# Patient Record
Sex: FEMALE | Race: WHITE | Employment: UNEMPLOYED | URBAN - METROPOLITAN AREA
[De-identification: names, ages, dates, MRNs, and addresses within clinical notes are randomized per-mention and may not be internally consistent; named-entity substitution may affect disease eponyms.]

---

## 2018-01-06 ENCOUNTER — HOSPITAL ENCOUNTER (EMERGENCY)
Facility: CLINIC | Age: 58
Discharge: HOME | End: 2018-01-06
Attending: FAMILY MEDICINE | Admitting: FAMILY MEDICINE
Payer: COMMERCIAL

## 2018-01-06 VITALS
RESPIRATION RATE: 20 BRPM | SYSTOLIC BLOOD PRESSURE: 118 MMHG | DIASTOLIC BLOOD PRESSURE: 69 MMHG | TEMPERATURE: 97.2 F | OXYGEN SATURATION: 96 % | WEIGHT: 235.2 LBS

## 2018-01-06 DIAGNOSIS — J20.9 ACUTE BRONCHITIS, UNSPECIFIED ORGANISM: ICD-10-CM

## 2018-01-06 DIAGNOSIS — J45.21 MILD INTERMITTENT ASTHMA WITH EXACERBATION: ICD-10-CM

## 2018-01-06 DIAGNOSIS — Z72.0 TOBACCO ABUSE: ICD-10-CM

## 2018-01-06 PROCEDURE — 99284 EMERGENCY DEPT VISIT MOD MDM: CPT | Mod: Z6 | Performed by: FAMILY MEDICINE

## 2018-01-06 PROCEDURE — 99282 EMERGENCY DEPT VISIT SF MDM: CPT | Performed by: FAMILY MEDICINE

## 2018-01-06 RX ORDER — AZITHROMYCIN 250 MG/1
TABLET, FILM COATED ORAL
Qty: 6 TABLET | Refills: 0 | Status: SHIPPED | OUTPATIENT
Start: 2018-01-06 | End: 2018-06-08

## 2018-01-06 RX ORDER — PREDNISONE 20 MG/1
TABLET ORAL
Qty: 10 TABLET | Refills: 0 | Status: SHIPPED | OUTPATIENT
Start: 2018-01-06 | End: 2018-06-08

## 2018-01-06 ASSESSMENT — ENCOUNTER SYMPTOMS
TROUBLE SWALLOWING: 0
VOMITING: 0
PALPITATIONS: 0
COUGH: 1
ABDOMINAL PAIN: 0
LIGHT-HEADEDNESS: 0
BACK PAIN: 0
WHEEZING: 1
NAUSEA: 0
CONFUSION: 0
CHEST TIGHTNESS: 0
SHORTNESS OF BREATH: 1
DYSURIA: 0
DIARRHEA: 0
EYE REDNESS: 0
CHILLS: 0
SINUS PAIN: 1
POLYDIPSIA: 0
FATIGUE: 1
FEVER: 0
DIZZINESS: 0
WEAKNESS: 0
SORE THROAT: 0

## 2018-01-06 NOTE — ED PROVIDER NOTES
History     Chief Complaint   Patient presents with     Cold Symptoms     HPI  Zina Yu is a 57 year old female who presents to the emergency room with increased cough congestion and wheezing.  The patient does continue to smoke occasionally.  She had a cold 3 weeks ago and has been coughing ever since.  Her sinuses feel constantly plugged.  The last few days she has increased wheezing and has been using her albuterol inhaler and nebulizer.  She has been coughing up purulent sputum.  She denies any fever with this.  In the past when she has had exacerbations she has been on Zithromax and prednisone.  She thinks the last time she was on these was at least 2 years ago.  She has a history of pneumonia but does not feel feverish or sick like she was with that.  She has more or less quit smoking but does have an occasional cigarette every other or third day.    Problem List:    There are no active problems to display for this patient.       Past Medical History:    Past Medical History:   Diagnosis Date     Chronic kidney disease      Hypertension      Uncomplicated asthma        Past Surgical History:    Past Surgical History:   Procedure Laterality Date     BACK SURGERY      back surgery       Family History:    No family history on file.    Social History:  Marital Status:   [4]  Social History   Substance Use Topics     Smoking status: Current Some Day Smoker     Smokeless tobacco: Not on file     Alcohol use No        Medications:      LISINOPRIL PO   METOPROLOL SUCCINATE ER PO   azithromycin (ZITHROMAX Z-AG) 250 MG tablet   predniSONE (DELTASONE) 20 MG tablet         Review of Systems   Constitutional: Positive for fatigue. Negative for chills and fever.   HENT: Positive for congestion and sinus pain. Negative for sore throat and trouble swallowing.    Eyes: Negative for redness.   Respiratory: Positive for cough, shortness of breath and wheezing. Negative for chest tightness.    Cardiovascular:  Negative for chest pain and palpitations.   Gastrointestinal: Negative for abdominal pain, diarrhea, nausea and vomiting.   Endocrine: Negative for polydipsia and polyuria.   Genitourinary: Negative for dysuria.   Musculoskeletal: Negative for back pain.   Skin: Negative for rash.   Allergic/Immunologic: Negative for immunocompromised state.   Neurological: Negative for dizziness, weakness and light-headedness.   Psychiatric/Behavioral: Negative for confusion.       Physical Exam   BP: 118/69  Heart Rate: 94  Temp: 97.2  F (36.2  C)  Resp: 20  Weight: 106.7 kg (235 lb 3.2 oz)  SpO2: 96 %      Physical Exam   Constitutional: She is oriented to person, place, and time.   Alert female.  Afebrile.  Frequent harsh cough.  Nonproductive./69  Temp 97.2  F (36.2  C) (Oral)  Resp 20  Wt 106.7 kg (235 lb 3.2 oz)  SpO2 96%     HENT:   Head: Normocephalic.   Right Ear: External ear normal.   Left Ear: External ear normal.   Nose: Nose normal.   Mouth/Throat: Oropharynx is clear and moist.   Eyes: Conjunctivae are normal.   Neck: Normal range of motion. Neck supple.   Cardiovascular: Normal rate, regular rhythm and normal heart sounds.    Pulmonary/Chest: Effort normal. No respiratory distress. She has wheezes.   Scattered rhonchi and wheeze which changes with coughing.  Otherwise good air movement all lung fields.  No prolonged expiratory phase.  No increased work of breathing.   Abdominal: Soft.   Musculoskeletal: Normal range of motion.   Neurological: She is alert and oriented to person, place, and time.   Skin: Skin is warm and dry.   Psychiatric: She has a normal mood and affect. Her behavior is normal.   Nursing note and vitals reviewed.      ED Course     ED Course     Procedures               Critical Care time:  none               Labs Ordered and Resulted from Time of ED Arrival Up to the Time of Departure from the ED - No data to display    Assessments & Plan (with Medical Decision Making)    MDM--57-year-old female smoker who presents with 3 weeks of ongoing cough.  The last 3 days she is experiencing increased wheezing and difficulty breathing and has symptoms of asthmatic bronchitis.  Her sputum has become colored.  I have elected to place her on Zithromax and prednisone.  The patient has an albuterol nebulizer and I recommended she increase the use of this to every 4 hours while awake.  If she develops fever or further difficulty breathing she needs reevaluation.  Patient is comfortable with this treatment plan and she is discharged in stable condition.  I have reviewed the nursing notes.    I have reviewed the findings, diagnosis, plan and need for follow up with the patient.       New Prescriptions    AZITHROMYCIN (ZITHROMAX Z-AG) 250 MG TABLET    Take 2 pills today then 1 pill a day for 4 more days    PREDNISONE (DELTASONE) 20 MG TABLET    Take two tablets (= 40mg) each day for 5 (five) days       Final diagnoses:   Acute bronchitis, unspecified organism   Mild intermittent asthma with exacerbation   Tobacco abuse       1/6/2018   [facility] EMERGENCY DEPARTMENT     Judith, Garth BRUCE MD  01/06/18 0692

## 2018-01-06 NOTE — ED NOTES
Comes in today with cold symptoms, states has had a cough for a couple of weeks. Comes in because the symptoms are getting worse.

## 2018-01-06 NOTE — ED AVS SNAPSHOT
[facility] Emergency Department    [address] [place] [address]    Phone:  [phone]    Fax:  [phone]                                       Zina Yu   MRN: [id number]    Department:  [facility] Emergency Department   Date of Visit:  1/6/2018           After Visit Summary Signature Page     I have received my discharge instructions, and my questions have been answered. I have discussed any challenges I see with this plan with the nurse or doctor.    ..........................................................................................................................................  Patient/Patient Representative Signature      ..........................................................................................................................................  Patient Representative Print Name and Relationship to Patient    ..................................................               ................................................  Date                                            Time    ..........................................................................................................................................  Reviewed by Signature/Title    ...................................................              ..............................................  Date                                                            Time

## 2018-01-06 NOTE — ED AVS SNAPSHOT
[facility] Emergency Department    [address] [place] [address]    Phone:  [phone]    Fax:  [phone]                                       Zina Yu   MRN: [id number]    Department:  [facility] Emergency Department   Date of Visit:  1/6/2018           Patient Information     Date Of Birth          1960        Your diagnoses for this visit were:     Acute bronchitis, unspecified organism     Mild intermittent asthma with exacerbation     Tobacco abuse        You were seen by Judith, Garth BRUCE MD.      Follow-up Information     Follow up with [facility] Emergency Department.    Specialty:  EMERGENCY MEDICINE    Why:  If symptoms worsen    Contact information:    [address]   [place] [address] [phone]    Additional information:    From [address]: Exit at [address] on south side of [place]. Turn right on [address]. Turn left at stoplight on [address]. [facility] will be in view two blocks ahead      Discharge References/Attachments     BRONCHITIS, ANTIBIOTIC TREATMENT (ADULT) (ENGLISH)      24 Hour Appointment Hotline       To make an appointment at any [facility] clinic, call [phone] ([phone]). If you don't have a family doctor or clinic, we will help you find one. [facility] clinics are conveniently located to serve the needs of you and your family.             Review of your medicines      START taking        Dose / Directions Last dose taken    azithromycin 250 MG tablet   Commonly known as:  ZITHROMAX Z-AG   Quantity:  6 tablet        Take 2 pills today then 1 pill a day for 4 more days   Refills:  0        predniSONE 20 MG tablet   Commonly known as:  DELTASONE   Quantity:  10 tablet        Take two tablets (= 40mg) each day for 5 (five) days   Refills:  0          Our records show that you are taking the medicines listed below. If these are incorrect, please call your family doctor or clinic.   "      Dose / Directions Last dose taken    LISINOPRIL PO        Refills:  0        METOPROLOL SUCCINATE ER PO        Refills:  0                Prescriptions were sent or printed at these locations (2 Prescriptions)                   [id number] [place] - [address]   [address] [place] [address]    Telephone:  [phone]   Fax:  [phone]   Hours:                  E-Prescribed (2 of 2)         azithromycin (ZITHROMAX Z-AG) 250 MG tablet               predniSONE (DELTASONE) 20 MG tablet                Orders Needing Specimen Collection     None      Pending Results     No orders found from 1/4/2018 to 1/7/2018.            Pending Culture Results     No orders found from 1/4/2018 to 1/7/2018.            Pending Results Instructions     If you had any lab results that were not finalized at the time of your Discharge, you can call the ED Lab Result RN at [phone]. You will be contacted by this team for any positive Lab results or changes in treatment. The nurses are available 7 days a week from 10A to 6:30P.  You can leave a message 24 hours per day and they will return your call.        Thank you for choosing [facility]       Thank you for choosing [facility] for your care. Our goal is always to provide you with excellent care. Hearing back from our patients is one way we can continue to improve our services. Please take a few minutes to complete the written survey that you may receive in the mail after you visit with us. Thank you!        [employer] Information     [employer] lets you send messages to your doctor, view your test results, renew your prescriptions, schedule appointments and more. To sign up, go to [email or web address] . Click on \"Log in\" on the left side of the screen, which will take you to the Welcome page. Then click on \"Sign up Now\" on the right side of the page.     You will be asked to enter the access code listed below, as well as some personal information. Please " follow the directions to create your username and password.     Your access code is: [email or web address]  Expires: 2018 11:06 AM     Your access code will  in 90 days. If you need help or a new code, please call your [facility] clinic or [phone].        Care EveryWhere ID     This is your Care EveryWhere ID. This could be used by other organizations to access your [facility] medical records  [id number]        Equal Access to Services     EMILY LANTIGUA : Hadii aad ku hadasho Soomaali, waaxda luqadaha, qaybta kaalmada adeegyaalireza, carter canela . So [facility] [phone].    ATENCIÓN: Si habla español, tiene a rojo disposición servicios gratuitos de asistencia lingüística. Llame al [phone].    We comply with applicable federal civil rights laws and [place] laws. We do not discriminate on the basis of race, color, national origin, age, disability, sex, sexual orientation, or gender identity.            After Visit Summary       This is your record. Keep this with you and show to your community pharmacist(s) and doctor(s) at your next visit.

## 2018-01-06 NOTE — ED NOTES
Pt w/4 wk hx of various uri sx.  Cont w/cough-loose with associated generalized chest discomfort.  Using her inhaler/neb machine, tylenol, OTc cold tabs w/o much relief.  Seems like she and family are passing around various viruses.  Last evening cough, HA with eye/neck discomfort worse.  Chronic back pain worse r/t frequent cough.  Took oxycodone, tylenol, hydroxyzine this am, but otherwise no other meds today.

## 2018-06-08 ENCOUNTER — HOSPITAL ENCOUNTER (EMERGENCY)
Facility: CLINIC | Age: 58
Discharge: ANOTHER HOSPITAL | End: 2018-06-09
Attending: PHYSICIAN ASSISTANT | Admitting: PHYSICIAN ASSISTANT
Payer: COMMERCIAL

## 2018-06-08 DIAGNOSIS — K83.8 DILATED BILE DUCT: ICD-10-CM

## 2018-06-08 DIAGNOSIS — R10.11 ABDOMINAL PAIN, RIGHT UPPER QUADRANT: ICD-10-CM

## 2018-06-08 DIAGNOSIS — R79.89 ELEVATED LFTS: ICD-10-CM

## 2018-06-08 PROCEDURE — 99285 EMERGENCY DEPT VISIT HI MDM: CPT | Mod: 25 | Performed by: PHYSICIAN ASSISTANT

## 2018-06-08 PROCEDURE — 99285 EMERGENCY DEPT VISIT HI MDM: CPT | Mod: Z6 | Performed by: PHYSICIAN ASSISTANT

## 2018-06-08 RX ORDER — LIDOCAINE 50 MG/G
PATCH TOPICAL
COMMUNITY
Start: 2013-04-02

## 2018-06-08 RX ORDER — IPRATROPIUM BROMIDE AND ALBUTEROL SULFATE 2.5; .5 MG/3ML; MG/3ML
3 SOLUTION RESPIRATORY (INHALATION)
COMMUNITY
Start: 2016-11-14

## 2018-06-08 RX ORDER — ESCITALOPRAM OXALATE 20 MG/1
20 TABLET ORAL
COMMUNITY
Start: 2018-05-07

## 2018-06-08 RX ORDER — ALBUTEROL SULFATE 90 UG/1
1 AEROSOL, METERED RESPIRATORY (INHALATION)
COMMUNITY
Start: 2016-11-14

## 2018-06-08 RX ORDER — SIMVASTATIN 40 MG
40 TABLET ORAL
COMMUNITY
Start: 2018-01-09

## 2018-06-08 RX ORDER — TRAMADOL HYDROCHLORIDE 50 MG/1
50 TABLET ORAL
COMMUNITY
Start: 2017-03-03

## 2018-06-08 RX ORDER — HYDROCHLOROTHIAZIDE 50 MG/1
50 TABLET ORAL
COMMUNITY
Start: 2018-05-01

## 2018-06-08 RX ORDER — OXYCODONE HYDROCHLORIDE 10 MG/1
10 TABLET ORAL
COMMUNITY
Start: 2017-03-03

## 2018-06-08 RX ORDER — MONTELUKAST SODIUM 10 MG/1
10 TABLET ORAL
COMMUNITY
Start: 2018-01-09

## 2018-06-08 RX ORDER — ONDANSETRON 8 MG/1
8 TABLET, ORALLY DISINTEGRATING ORAL
COMMUNITY
Start: 2018-05-01

## 2018-06-08 RX ORDER — DIAZEPAM 5 MG
TABLET ORAL
COMMUNITY
Start: 2018-01-23

## 2018-06-08 RX ORDER — FLUTICASONE PROPIONATE 50 MCG
SPRAY, SUSPENSION (ML) NASAL
COMMUNITY
Start: 2017-08-07

## 2018-06-08 RX ORDER — ONDANSETRON 2 MG/ML
4 INJECTION INTRAMUSCULAR; INTRAVENOUS EVERY 30 MIN PRN
Status: DISCONTINUED | OUTPATIENT
Start: 2018-06-08 | End: 2018-06-09 | Stop reason: HOSPADM

## 2018-06-09 ENCOUNTER — APPOINTMENT (OUTPATIENT)
Dept: CT IMAGING | Facility: CLINIC | Age: 58
End: 2018-06-09
Attending: PHYSICIAN ASSISTANT
Payer: COMMERCIAL

## 2018-06-09 VITALS
RESPIRATION RATE: 18 BRPM | SYSTOLIC BLOOD PRESSURE: 132 MMHG | OXYGEN SATURATION: 97 % | HEART RATE: 86 BPM | DIASTOLIC BLOOD PRESSURE: 79 MMHG | WEIGHT: 225 LBS | TEMPERATURE: 97.2 F

## 2018-06-09 LAB
ALBUMIN SERPL-MCNC: 3.1 G/DL (ref 3.4–5)
ALBUMIN UR-MCNC: NEGATIVE MG/DL
ALP SERPL-CCNC: 395 U/L (ref 40–150)
ALT SERPL W P-5'-P-CCNC: 346 U/L (ref 0–50)
ANION GAP SERPL CALCULATED.3IONS-SCNC: 8 MMOL/L (ref 3–14)
APPEARANCE UR: CLEAR
AST SERPL W P-5'-P-CCNC: 132 U/L (ref 0–45)
BILIRUB SERPL-MCNC: 0.5 MG/DL (ref 0.2–1.3)
BILIRUB UR QL STRIP: NEGATIVE
BUN SERPL-MCNC: 14 MG/DL (ref 7–30)
CALCIUM SERPL-MCNC: 9 MG/DL (ref 8.5–10.1)
CHLORIDE SERPL-SCNC: 95 MMOL/L (ref 94–109)
CO2 SERPL-SCNC: 31 MMOL/L (ref 20–32)
COLOR UR AUTO: NORMAL
CREAT SERPL-MCNC: 1.01 MG/DL (ref 0.52–1.04)
CRP SERPL-MCNC: 14.2 MG/L (ref 0–8)
DIFFERENTIAL METHOD BLD: NORMAL
ERYTHROCYTE [DISTWIDTH] IN BLOOD BY AUTOMATED COUNT: 13.4 % (ref 10–15)
GFR SERPL CREATININE-BSD FRML MDRD: 56 ML/MIN/1.7M2
GLUCOSE SERPL-MCNC: 99 MG/DL (ref 70–99)
GLUCOSE UR STRIP-MCNC: NEGATIVE MG/DL
HCT VFR BLD AUTO: 43.2 % (ref 35–47)
HGB BLD-MCNC: 14.9 G/DL (ref 11.7–15.7)
HGB UR QL STRIP: NEGATIVE
IMM GRANULOCYTES # BLD: 0.1 10E9/L (ref 0–0.4)
IMM GRANULOCYTES NFR BLD: 0.6 %
KETONES UR STRIP-MCNC: NEGATIVE MG/DL
LEUKOCYTE ESTERASE UR QL STRIP: NEGATIVE
MCH RBC QN AUTO: 31.6 PG (ref 26.5–33)
MCHC RBC AUTO-ENTMCNC: 34.5 G/DL (ref 31.5–36.5)
MCV RBC AUTO: 92 FL (ref 78–100)
NITRATE UR QL: NEGATIVE
PH UR STRIP: 6 PH (ref 5–7)
PLATELET # BLD AUTO: 157 10E9/L (ref 150–450)
POTASSIUM SERPL-SCNC: 3.2 MMOL/L (ref 3.4–5.3)
PROT SERPL-MCNC: 7.5 G/DL (ref 6.8–8.8)
RBC # BLD AUTO: 4.72 10E12/L (ref 3.8–5.2)
RBC #/AREA URNS AUTO: <1 /HPF (ref 0–2)
SODIUM SERPL-SCNC: 134 MMOL/L (ref 133–144)
SOURCE: NORMAL
SP GR UR STRIP: 1.03 (ref 1–1.03)
SQUAMOUS #/AREA URNS AUTO: <1 /HPF (ref 0–1)
UROBILINOGEN UR STRIP-MCNC: 0 MG/DL (ref 0–2)
WBC # BLD AUTO: 9.1 10E9/L (ref 4–11)
WBC #/AREA URNS AUTO: <1 /HPF (ref 0–5)

## 2018-06-09 PROCEDURE — 80053 COMPREHEN METABOLIC PANEL: CPT | Performed by: PHYSICIAN ASSISTANT

## 2018-06-09 PROCEDURE — 25000128 H RX IP 250 OP 636: Performed by: PHYSICIAN ASSISTANT

## 2018-06-09 PROCEDURE — 86140 C-REACTIVE PROTEIN: CPT | Performed by: PHYSICIAN ASSISTANT

## 2018-06-09 PROCEDURE — 85025 COMPLETE CBC W/AUTO DIFF WBC: CPT | Performed by: PHYSICIAN ASSISTANT

## 2018-06-09 PROCEDURE — 25000125 ZZHC RX 250: Performed by: PHYSICIAN ASSISTANT

## 2018-06-09 PROCEDURE — 81001 URINALYSIS AUTO W/SCOPE: CPT | Performed by: PHYSICIAN ASSISTANT

## 2018-06-09 PROCEDURE — 96375 TX/PRO/DX INJ NEW DRUG ADDON: CPT | Performed by: PHYSICIAN ASSISTANT

## 2018-06-09 PROCEDURE — 96374 THER/PROPH/DIAG INJ IV PUSH: CPT | Mod: 59 | Performed by: PHYSICIAN ASSISTANT

## 2018-06-09 PROCEDURE — 74177 CT ABD & PELVIS W/CONTRAST: CPT

## 2018-06-09 PROCEDURE — 96376 TX/PRO/DX INJ SAME DRUG ADON: CPT | Performed by: PHYSICIAN ASSISTANT

## 2018-06-09 PROCEDURE — 25000128 H RX IP 250 OP 636: Performed by: FAMILY MEDICINE

## 2018-06-09 RX ORDER — IOPAMIDOL 755 MG/ML
100 INJECTION, SOLUTION INTRAVASCULAR ONCE
Status: COMPLETED | OUTPATIENT
Start: 2018-06-09 | End: 2018-06-09

## 2018-06-09 RX ORDER — HYDROMORPHONE HYDROCHLORIDE 2 MG/1
2 TABLET ORAL ONCE
Status: DISCONTINUED | OUTPATIENT
Start: 2018-06-09 | End: 2018-06-09

## 2018-06-09 RX ORDER — HYDROMORPHONE HYDROCHLORIDE 1 MG/ML
0.5 INJECTION, SOLUTION INTRAMUSCULAR; INTRAVENOUS; SUBCUTANEOUS
Status: DISCONTINUED | OUTPATIENT
Start: 2018-06-09 | End: 2018-06-09 | Stop reason: HOSPADM

## 2018-06-09 RX ORDER — HYDROMORPHONE HYDROCHLORIDE 1 MG/ML
0.5 INJECTION, SOLUTION INTRAMUSCULAR; INTRAVENOUS; SUBCUTANEOUS ONCE
Status: COMPLETED | OUTPATIENT
Start: 2018-06-09 | End: 2018-06-09

## 2018-06-09 RX ADMIN — HYDROMORPHONE HYDROCHLORIDE 0.5 MG: 1 INJECTION, SOLUTION INTRAMUSCULAR; INTRAVENOUS; SUBCUTANEOUS at 03:20

## 2018-06-09 RX ADMIN — HYDROMORPHONE HYDROCHLORIDE 0.5 MG: 1 INJECTION, SOLUTION INTRAMUSCULAR; INTRAVENOUS; SUBCUTANEOUS at 01:42

## 2018-06-09 RX ADMIN — HYDROMORPHONE HYDROCHLORIDE 1 MG: 1 INJECTION, SOLUTION INTRAMUSCULAR; INTRAVENOUS; SUBCUTANEOUS at 00:09

## 2018-06-09 RX ADMIN — SODIUM CHLORIDE 60 ML: 9 INJECTION, SOLUTION INTRAVENOUS at 00:39

## 2018-06-09 RX ADMIN — IOPAMIDOL 100 ML: 755 INJECTION, SOLUTION INTRAVENOUS at 00:38

## 2018-06-09 RX ADMIN — ONDANSETRON 4 MG: 2 INJECTION INTRAMUSCULAR; INTRAVENOUS at 00:09

## 2018-06-09 ASSESSMENT — ENCOUNTER SYMPTOMS
ABDOMINAL PAIN: 1
DYSURIA: 0
NAUSEA: 1
VOMITING: 0
BACK PAIN: 1
CHILLS: 0
FEVER: 0
DIARRHEA: 0
SHORTNESS OF BREATH: 0

## 2018-06-09 NOTE — ED TRIAGE NOTES
Pt states she had her gallbladder out on 5-30-18 at [facility].  States the pain is worse now then is was before her gallbladder came out. C/o right sided mid/lower back pain and then around to her upper abd.

## 2018-06-09 NOTE — ED PROVIDER NOTES
"  History     Chief Complaint   Patient presents with     Post-op Problem     HPI  Zina Yu is a 57 year old female who presents to the ED complaining of post-operative pain. The patient had her gall bladder removed at [facility] on 5/30/18. Since then, she reports she did pretty well for the first few days after going home.  However in the last few days the pain has progressively gotten worse again.  She describes it as a pain in her right back into her right upper abdomen.  The abdominal pain is \"excruciating and sharp\" and the back pain she cannot describe beyond that it \"hurts.\"  She has been taking tramadol and oxycodone which she has for her chronic low back pain but she reports that this does not do much.  Icing the areas do help.  She reports mild nausea but no vomiting.  She feels a bit constipated but that is her normal.  Denies fever.  No shortness of breath or chest pain.  When asked about her chronic back pain she states that it \"hurts all over, I am just a mess back there.\" Denies urinary symptoms.    Problem List:    There are no active problems to display for this patient.       Past Medical History:    Past Medical History:   Diagnosis Date     Chronic kidney disease      Hypertension      Uncomplicated asthma        Past Surgical History:    Past Surgical History:   Procedure Laterality Date     BACK SURGERY      back surgery     CHOLECYSTECTOMY         Family History:    No family history on file.    Social History:  Marital Status:   [4]  Social History   Substance Use Topics     Smoking status: Current Some Day Smoker     Smokeless tobacco: Never Used     Alcohol use No        Medications:      albuterol (PROAIR HFA/PROVENTIL HFA/VENTOLIN HFA) 108 (90 Base) MCG/ACT Inhaler   diazepam (VALIUM) 5 MG tablet   escitalopram (LEXAPRO) 20 MG tablet   fluticasone (FLONASE) 50 MCG/ACT spray   hydrochlorothiazide (HYDRODIURIL) 50 MG tablet   ipratropium - albuterol 0.5 mg/2.5 mg/3 mL " (DUONEB) 0.5-2.5 (3) MG/3ML neb solution   lidocaine (LIDODERM) 5 % Patch   montelukast (SINGULAIR) 10 MG tablet   ondansetron (ZOFRAN-ODT) 8 MG ODT tab   oxyCODONE IR (ROXICODONE) 10 MG tablet   simvastatin (ZOCOR) 40 MG tablet   traMADol (ULTRAM) 50 MG tablet   B Complex Vitamins (VITAMIN B COMPLEX PO)   LISINOPRIL PO   METOPROLOL SUCCINATE ER PO         Review of Systems   Constitutional: Negative for chills and fever.   Respiratory: Negative for shortness of breath.    Cardiovascular: Negative for chest pain.   Gastrointestinal: Positive for abdominal pain and nausea. Negative for diarrhea and vomiting.   Genitourinary: Negative for dysuria.   Musculoskeletal: Positive for back pain (chronic, more acute on right side now).   Skin: Negative for rash.   All other systems reviewed and are negative.      Physical Exam   BP: 131/72  Pulse: 86  Temp: 97.2  F (36.2  C)  Resp: 18  Weight: 102.1 kg (225 lb)  SpO2: 95 %      Physical Exam   Constitutional: She is oriented to person, place, and time. She appears well-developed and well-nourished.   Obese female, laying on side. Appears uncomfortable. Moves slowly secondary to pain.   HENT:   Head: Normocephalic and atraumatic.   Mouth/Throat: Oropharynx is clear and moist.   Eyes: Conjunctivae are normal. Pupils are equal, round, and reactive to light. No scleral icterus.   Cardiovascular: Normal rate, regular rhythm, normal heart sounds and intact distal pulses.    Pulmonary/Chest: Effort normal and breath sounds normal. No respiratory distress.   Abdominal: Soft. Bowel sounds are normal. She exhibits distension. There is tenderness (mild with deep palpation to area noted). There is CVA tenderness (mild, right). There is no rigidity, no rebound and no guarding.       Surgical incisions dry, no surrounding erythema or discharge   Musculoskeletal: She exhibits no edema or tenderness.   Neurological: She is alert and oriented to person, place, and time.   Skin: Skin is warm  and dry. No rash noted. She is not diaphoretic.   Psychiatric: She has a normal mood and affect.   Nursing note and vitals reviewed.      ED Course     ED Course     Procedures    Results for orders placed or performed during the hospital encounter of 06/08/18 (from the past 24 hour(s))   CBC with platelets differential   Result Value Ref Range    WBC 9.1 4.0 - 11.0 10e9/L    RBC Count 4.72 3.8 - 5.2 10e12/L    Hemoglobin 14.9 11.7 - 15.7 g/dL    Hematocrit 43.2 35.0 - 47.0 %    MCV 92 78 - 100 fl    MCH 31.6 26.5 - 33.0 pg    MCHC 34.5 31.5 - 36.5 g/dL    RDW 13.4 10.0 - 15.0 %    Platelet Count 157 150 - 450 10e9/L    Diff Method Automated Method     % Immature Granulocytes 0.6 %    Abs Immature Granulocytes 0.1 0 - 0.4 10e9/L   Comprehensive metabolic panel   Result Value Ref Range    Sodium 134 133 - 144 mmol/L    Potassium 3.2 (L) 3.4 - 5.3 mmol/L    Chloride 95 94 - 109 mmol/L    Carbon Dioxide 31 20 - 32 mmol/L    Anion Gap 8 3 - 14 mmol/L    Glucose 99 70 - 99 mg/dL    Urea Nitrogen 14 7 - 30 mg/dL    Creatinine 1.01 0.52 - 1.04 mg/dL    GFR Estimate 56 (L) >60 mL/min/1.7m2    GFR Estimate If Black 68 >60 mL/min/1.7m2    Calcium 9.0 8.5 - 10.1 mg/dL    Bilirubin Total 0.5 0.2 - 1.3 mg/dL    Albumin 3.1 (L) 3.4 - 5.0 g/dL    Protein Total 7.5 6.8 - 8.8 g/dL    Alkaline Phosphatase 395 (H) 40 - 150 U/L     (H) 0 - 50 U/L     (H) 0 - 45 U/L   CRP inflammation   Result Value Ref Range    CRP Inflammation 14.2 (H) 0.0 - 8.0 mg/L   CT Abdomen Pelvis w Contrast    Narrative    CT ABDOMEN PELVIS W CONTRAST  6/9/2018 12:46 AM     HISTORY: Right-sided abdominal pain, recent cholecystectomy.    TECHNIQUE: Volumetric acquisition through abdomen and pelvis with IV  contrast. 100 mL Isovue-370. Radiation dose for this scan was reduced  using automated exposure control, adjustment of the mA and/or kV  according to patient size, or iterative reconstruction technique.    COMPARISON: None.    FINDINGS: Partially  visualized endograft in the lower thoracic aorta.  Gallbladder is absent. No fluid collections in the gallbladder fossa.  Slight prominence of the common hepatic duct and central intrahepatic  ducts. The common hepatic duct measures 1.2 cm in diameter. No focal  liver lesions. The spleen, pancreas, adrenal glands, and kidneys  demonstrate no worrisome findings. No hydronephrosis. Low attenuation  subcentimeter renal lesion(s). These are compatible with small benign  cysts and no specific imaging evaluation or follow-up is recommended.    Uterus is present. No suspicious adnexal masses. Moderate stool in the  colon. No bowel obstruction or ascites. Mild fat stranding along the  umbilicus likely relating to recent surgery. Advanced degenerative  changes throughout the visualized spine and postoperative changes in  the lumbar spine.      Impression    IMPRESSION:  1. Slight bile duct prominence in the setting of recent  cholecystectomy. Correlate with clinical and laboratory findings. No  other cause of obstruction is seen.  2. No fluid collections or other acute findings.  3. Moderate stool in the colon.       Medications   ondansetron (ZOFRAN) injection 4 mg (4 mg Intravenous Given 6/9/18 0009)   HYDROmorphone (PF) (DILAUDID) injection 0.5 mg (not administered)   HYDROmorphone (DILAUDID) injection 1 mg (1 mg Intravenous Given 6/9/18 0009)   iopamidol (ISOVUE-370) solution 100 mL (100 mLs Intravenous Given 6/9/18 0038)   sodium chloride 0.9 % bag 500mL for CT scan flush use (60 mLs As instructed Given 6/9/18 0039)       Assessments & Plan (with Medical Decision Making)  Zina Yu is a 57 year old female who presented to the ED approximately 10 days postop of a laparoscopic cholecystectomy complaining of increased right upper quadrant pain.  Mild nausea but no other symptoms.  Vitals unremarkable on arrival.  She did exhibit tenderness throughout the right upper abdomen into the right flank area.  No significant  rebound or guarding.  Concern for postoperative complication to include bleeding, infection, bile leak, obstructive ductal process.  The Axis obtained patient was given IV Dilaudid and Zofran.  Labs today showed normal white count of 9.1, mild elevation in CRP of 14.2.  Alkaline phosphatase elevated at 395, , and .  Bilirubin was normal however the rest of her LFTs are markedly elevated in comparison to previous on 5/30 through [facility].  Her CT scan today showed bile duct prominence of 1.2 cm in diameter.  Common bile duct before her surgery was 0.7 cm.  These findings are concerning for some sort of duct obstruction.  I called and spoke with Dr. Briseno, on-call surgeon at [facility] where she had her previous surgery, who agreed that she may have a retained gallstone in the duct that will require removal.  He accepted her in transfer onto his service.  Patient was updated on results and plan of care and is agreeable to transfer via ambulance.  She was given repeated doses of IV Dilaudid for pain control while in the ED.  Patient was staffed with Dr. Bailey.     This is a shared ER visit.  Cornelia Iverson discussed the patient's condition and plan of care with me. I reviewed the patient's past medical history, symptoms of present illness, laboratory findings, and imaging results in the emergency room. I'm in agreement with the assessment and plan of care.  Edgardo Bailey  Physician  [facility] Emergency Room       I have reviewed the nursing notes.    I have reviewed the findings, diagnosis, plan and need for follow up with the patient.    New Prescriptions    No medications on file       Final diagnoses:   Abdominal pain, right upper quadrant   Dilated bile duct   Elevated LFTs     Note: Chart documentation done in part with Dragon Voice Recognition software. Although reviewed after completion, some word and grammatical errors may remain.     6/8/2018   [facility]  EMERGENCY DEPARTMENT     Cornelia Iverson PA-C  06/09/18 0131       Edgardo Bailey DO  06/09/18 1530

## 2018-06-09 NOTE — ED NOTES
LATE ENTRY DUE TO PT CARE:    Pt stable.  Pt was placed on 2 LPM oxygen via nasal cannula after pain medication administration.  Pt's pain is under control for about 1 hour after pain medication administration.  Report was given to Kiersten STALLWORTH at [address].  EMS contacted to transport pt.  Pt signed [facility] paperwork.

## 2021-06-20 ENCOUNTER — APPOINTMENT (OUTPATIENT)
Dept: CT IMAGING | Facility: CLINIC | Age: 61
End: 2021-06-20
Attending: EMERGENCY MEDICINE
Payer: COMMERCIAL

## 2021-06-20 ENCOUNTER — HOSPITAL ENCOUNTER (EMERGENCY)
Facility: CLINIC | Age: 61
Discharge: HOME | End: 2021-06-20
Attending: EMERGENCY MEDICINE | Admitting: EMERGENCY MEDICINE
Payer: COMMERCIAL

## 2021-06-20 VITALS
TEMPERATURE: 98.4 F | HEART RATE: 88 BPM | RESPIRATION RATE: 20 BRPM | WEIGHT: 244.1 LBS | OXYGEN SATURATION: 96 % | DIASTOLIC BLOOD PRESSURE: 76 MMHG | SYSTOLIC BLOOD PRESSURE: 146 MMHG

## 2021-06-20 DIAGNOSIS — K11.8 SUBMANDIBULAR DUCT OBSTRUCTION: ICD-10-CM

## 2021-06-20 LAB
ANION GAP SERPL CALCULATED.3IONS-SCNC: 7 MMOL/L (ref 3–14)
BASOPHILS # BLD AUTO: 0 10E9/L (ref 0–0.2)
BASOPHILS NFR BLD AUTO: 0.5 %
BUN SERPL-MCNC: 11 MG/DL (ref 7–30)
CALCIUM SERPL-MCNC: 9.1 MG/DL (ref 8.5–10.1)
CHLORIDE SERPL-SCNC: 101 MMOL/L (ref 94–109)
CO2 SERPL-SCNC: 30 MMOL/L (ref 20–32)
CREAT SERPL-MCNC: 0.67 MG/DL (ref 0.52–1.04)
CRP SERPL-MCNC: 6.2 MG/L (ref 0–8)
DEPRECATED S PYO AG THROAT QL EIA: NEGATIVE
DIFFERENTIAL METHOD BLD: ABNORMAL
EOSINOPHIL # BLD AUTO: 0.2 10E9/L (ref 0–0.7)
EOSINOPHIL NFR BLD AUTO: 2.1 %
ERYTHROCYTE [DISTWIDTH] IN BLOOD BY AUTOMATED COUNT: 13.1 % (ref 10–15)
GFR SERPL CREATININE-BSD FRML MDRD: >90 ML/MIN/{1.73_M2}
GLUCOSE SERPL-MCNC: 123 MG/DL (ref 70–99)
HCT VFR BLD AUTO: 43.1 % (ref 35–47)
HGB BLD-MCNC: 14.8 G/DL (ref 11.7–15.7)
IMM GRANULOCYTES # BLD: 0 10E9/L (ref 0–0.4)
IMM GRANULOCYTES NFR BLD: 0.3 %
LYMPHOCYTES # BLD AUTO: 2 10E9/L (ref 0.8–5.3)
LYMPHOCYTES NFR BLD AUTO: 25 %
MCH RBC QN AUTO: 31.6 PG (ref 26.5–33)
MCHC RBC AUTO-ENTMCNC: 34.3 G/DL (ref 31.5–36.5)
MCV RBC AUTO: 92 FL (ref 78–100)
MONOCYTES # BLD AUTO: 0.5 10E9/L (ref 0–1.3)
MONOCYTES NFR BLD AUTO: 5.9 %
NEUTROPHILS # BLD AUTO: 5.3 10E9/L (ref 1.6–8.3)
NEUTROPHILS NFR BLD AUTO: 66.2 %
NRBC # BLD AUTO: 0 10*3/UL
NRBC BLD AUTO-RTO: 0 /100
PLATELET # BLD AUTO: 132 10E9/L (ref 150–450)
POTASSIUM SERPL-SCNC: 3.8 MMOL/L (ref 3.4–5.3)
RBC # BLD AUTO: 4.69 10E12/L (ref 3.8–5.2)
SODIUM SERPL-SCNC: 138 MMOL/L (ref 133–144)
SPECIMEN SOURCE: NORMAL
SPECIMEN SOURCE: NORMAL
STREP GROUP A PCR: NOT DETECTED
WBC # BLD AUTO: 8 10E9/L (ref 4–11)

## 2021-06-20 PROCEDURE — 258N000003 HC RX IP 258 OP 636: Performed by: EMERGENCY MEDICINE

## 2021-06-20 PROCEDURE — 250N000011 HC RX IP 250 OP 636: Performed by: EMERGENCY MEDICINE

## 2021-06-20 PROCEDURE — 250N000009 HC RX 250: Performed by: EMERGENCY MEDICINE

## 2021-06-20 PROCEDURE — 99284 EMERGENCY DEPT VISIT MOD MDM: CPT | Performed by: EMERGENCY MEDICINE

## 2021-06-20 PROCEDURE — 999N001174 HC STATISTIC STREP A RAPID: Performed by: EMERGENCY MEDICINE

## 2021-06-20 PROCEDURE — 87651 STREP A DNA AMP PROBE: CPT | Performed by: EMERGENCY MEDICINE

## 2021-06-20 PROCEDURE — 70491 CT SOFT TISSUE NECK W/DYE: CPT

## 2021-06-20 PROCEDURE — 80048 BASIC METABOLIC PNL TOTAL CA: CPT | Performed by: EMERGENCY MEDICINE

## 2021-06-20 PROCEDURE — 85025 COMPLETE CBC W/AUTO DIFF WBC: CPT | Performed by: EMERGENCY MEDICINE

## 2021-06-20 PROCEDURE — 99285 EMERGENCY DEPT VISIT HI MDM: CPT | Mod: 25 | Performed by: EMERGENCY MEDICINE

## 2021-06-20 PROCEDURE — 86140 C-REACTIVE PROTEIN: CPT | Performed by: EMERGENCY MEDICINE

## 2021-06-20 RX ORDER — SODIUM CHLORIDE 9 MG/ML
INJECTION, SOLUTION INTRAVENOUS CONTINUOUS
Status: DISCONTINUED | OUTPATIENT
Start: 2021-06-20 | End: 2021-06-20 | Stop reason: HOSPADM

## 2021-06-20 RX ORDER — IOPAMIDOL 755 MG/ML
500 INJECTION, SOLUTION INTRAVASCULAR ONCE
Status: COMPLETED | OUTPATIENT
Start: 2021-06-20 | End: 2021-06-20

## 2021-06-20 RX ADMIN — SODIUM CHLORIDE 68 ML: 9 INJECTION, SOLUTION INTRAVENOUS at 12:02

## 2021-06-20 RX ADMIN — IOPAMIDOL 80 ML: 755 INJECTION, SOLUTION INTRAVENOUS at 12:02

## 2021-06-20 RX ADMIN — SODIUM CHLORIDE 1000 ML: 9 INJECTION, SOLUTION INTRAVENOUS at 11:22

## 2021-06-20 NOTE — ED PROVIDER NOTES
"  History     Chief Complaint   Patient presents with     Oral Swelling     Rigth neck swelilng. Throat pain. Cough.      The history is provided by the patient.     Zina Yu is a 60 year old female who presents to the emergency department secondary to swelling and pain in her mouth and throat. The patient reports that last night she noticed pain and a \"bulge\" under her tongue and states that it felt \"raw\". She has pain and trouble swallowing because her throat \"feels swollen\". This morning she noticed the right side of her face was swollen. She used mouth wash without relief. No dental pain. No known injury to the mouth. No sick contacts at home. The patient's tonsils are still in place. No new oral medications. Started topical cream for bone spur to left heel and notes no reaction with this. Most recent covid test was 19 days ago and was negative.     Allergies:  Allergies   Allergen Reactions     Nsaids        Problem List:    There are no active problems to display for this patient.       Past Medical History:    Past Medical History:   Diagnosis Date     Chronic kidney disease      Hypertension      Uncomplicated asthma        Past Surgical History:    Past Surgical History:   Procedure Laterality Date     BACK SURGERY      back surgery     CHOLECYSTECTOMY         Family History:    No family history on file.    Social History:  Marital Status:   [4]  Social History     Tobacco Use     Smoking status: Current Some Day Smoker     Smokeless tobacco: Never Used   Substance Use Topics     Alcohol use: No     Drug use: No        Medications:    albuterol (PROAIR HFA/PROVENTIL HFA/VENTOLIN HFA) 108 (90 Base) MCG/ACT Inhaler  B Complex Vitamins (VITAMIN B COMPLEX PO)  diazepam (VALIUM) 5 MG tablet  escitalopram (LEXAPRO) 20 MG tablet  fluticasone (FLONASE) 50 MCG/ACT spray  hydrochlorothiazide (HYDRODIURIL) 50 MG tablet  ipratropium - albuterol 0.5 mg/2.5 mg/3 mL (DUONEB) 0.5-2.5 (3) MG/3ML neb " "solution  lidocaine (LIDODERM) 5 % Patch  LISINOPRIL PO  METOPROLOL SUCCINATE ER PO  montelukast (SINGULAIR) 10 MG tablet  ondansetron (ZOFRAN-ODT) 8 MG ODT tab  oxyCODONE IR (ROXICODONE) 10 MG tablet  simvastatin (ZOCOR) 40 MG tablet  traMADol (ULTRAM) 50 MG tablet          Review of Systems   All other systems reviewed and are negative.      Physical Exam   BP: (!) 155/90  Pulse: 89  Temp: 98.4  F (36.9  C)  Resp: 20  Weight: 110.7 kg (244 lb 1.6 oz)  SpO2: 97 %      Physical Exam  Vitals signs and nursing note reviewed.         ED Course        Procedures      Critical Care time:  {none or minutes:146148::\"none\"}    No results found for this or any previous visit (from the past 24 hour(s)).    Medications - No data to display    Assessments & Plan (with Medical Decision Making)   Zina is a 60 year old female who presents to the emergency department with concerns of swelling and pain to her mouth and throat starting last night. Trouble swallowing due to pain and swelling. No difficulty breathing. No known injury. No new medications. Vitals are stable and she is afebrile. Physical exam as noted above. IV was established and a liter of fluids was started. Rapid strep was negative. Blood work showed no significant abnormalities. CT of the neck revealed a calcification in the right submandibular duct with inflammation of the right submandibular gland.     ***    I have reviewed the nursing notes.    I have reviewed the findings, diagnosis, plan and need for follow up with the patient.  {ED Addendum:994853::\" \"}    New Prescriptions    No medications on file       Final diagnoses:   None       This document serves as a record of services personally performed by Eliud Gardiner MD. It was created on their behalf by Yamilet Iverson, a trained medical scribe. The creation of this record is based on the provider's personal observations and the statements of the patient. This document has been checked and approved by the " attending provider.    Note: Chart documentation done in part with Dragon Voice Recognition software. Although reviewed after completion, some word and grammatical errors may remain.    6/20/2021   [facility] EMERGENCY DEPT

## 2021-06-20 NOTE — DISCHARGE INSTRUCTIONS
Your duct underneath your tongue has a small stone blocking the drainage of saliva  Antibiotics to prevent secondary infection.  You can try sour candy which will increase the saliva production and hopefully push the stone out of the duct.  Take your home pain meds for discomfort.  If symptoms persist follow-up with  from ear nose and throat.

## 2021-06-20 NOTE — ED TRIAGE NOTES
Health Maintenance Summary     Topic Due On Due Status Completed On Postpone Until Reason    Colorectal Cancer Screening - Colonoscopy Lane [address], 2021 Not Due [address], 2011      Immunization - Td/Tdap Dec 15, 2025 Not Due Dec 15, 2015      Immunization-Influenza Sep 1, 2016 Postponed  Apr 1, 2017 Patient Refused          Patient is up to date, no discussion needed .     Pt presents with throat pain and right neck swelling. Started last nite however worsening. Pt has on going cough since Mother day. Pt is smoker. Pt has been tested for covid in the past. Pt mentions she did just  start diclofenac topical cream for bone spur on left ankle.

## 2021-06-20 NOTE — ED PROVIDER NOTES
History     Chief Complaint   Patient presents with     Oral Swelling     Rigth neck swelilng. Throat pain. Cough.      HPI  Zina Yu is a 60 year old female who presents with concerns for oral swelling and throat pain.  Patient states symptoms began yesterday.  She states she thinks is swelling under her tongue and into her throat.  She denies difficulty with speech or swallowing secretions but hurts to swallow.  Denies fever or chills.  She was recently started on topical diclofenac for Achilles tendon swelling.  No other new exposures, meds, foods, or similar previous reaction in the past.  A mild nonproductive cough.  Denies change in her taste or smell.  Denies abdominal pain, nausea or vomiting.  No diarrhea dysuria.  No leg pain or swelling.  No skin rashes.  Patient did have a negative Covid test on 6/1/2021.  Treatment for symptoms prior to arrival today.    Allergies:  Allergies   Allergen Reactions     Nsaids        Problem List:    There are no active problems to display for this patient.       Past Medical History:    Past Medical History:   Diagnosis Date     Chronic kidney disease      Hypertension      Uncomplicated asthma        Past Surgical History:    Past Surgical History:   Procedure Laterality Date     BACK SURGERY      back surgery     CHOLECYSTECTOMY         Family History:    No family history on file.    Social History:  Marital Status:   [4]  Social History     Tobacco Use     Smoking status: Current Some Day Smoker     Smokeless tobacco: Never Used   Substance Use Topics     Alcohol use: No     Drug use: No        Medications:    amoxicillin-clavulanate (AUGMENTIN) 875-125 MG tablet  albuterol (PROAIR HFA/PROVENTIL HFA/VENTOLIN HFA) 108 (90 Base) MCG/ACT Inhaler  B Complex Vitamins (VITAMIN B COMPLEX PO)  diazepam (VALIUM) 5 MG tablet  escitalopram (LEXAPRO) 20 MG tablet  fluticasone (FLONASE) 50 MCG/ACT spray  hydrochlorothiazide (HYDRODIURIL) 50 MG tablet  ipratropium  - albuterol 0.5 mg/2.5 mg/3 mL (DUONEB) 0.5-2.5 (3) MG/3ML neb solution  lidocaine (LIDODERM) 5 % Patch  LISINOPRIL PO  METOPROLOL SUCCINATE ER PO  montelukast (SINGULAIR) 10 MG tablet  ondansetron (ZOFRAN-ODT) 8 MG ODT tab  oxyCODONE IR (ROXICODONE) 10 MG tablet  simvastatin (ZOCOR) 40 MG tablet  traMADol (ULTRAM) 50 MG tablet          Review of Systems all other systems are reviewed and are negative.    Physical Exam   BP: (!) 155/90  Pulse: 89  Temp: 98.4  F (36.9  C)  Resp: 20  Weight: 110.7 kg (244 lb 1.6 oz)  SpO2: 97 %      Physical Examalert female does not look toxic or ill.  HEENT shows no obvious facial swelling.  The ears are clear bilaterally.  Eyes show no injection.  Nasal passages are patent.  Orally mucosa is moist.  She is speaking complete sentences.  Is handling secretions.  No trismus or malocclusion.  On palpation she has no fluctuance of the gumline or under her tongue.  The soft palate is mildly erythematous but no exudate is noted.  Neck is supple without stridor.  She has tender anterior adenopathy.  Difficult to assess if she has swelling as she has large body habitus.    ED Course        Procedures               Critical Care time:  none               Results for orders placed or performed during the hospital encounter of 06/20/21 (from the past 24 hour(s))   Streptococcus A Rapid Scr w Reflx to PCR    Specimen: Throat   Result Value Ref Range    Strep Specimen Description Throat     Streptococcus Group A Rapid Screen Negative NEG^Negative   CBC with platelets differential   Result Value Ref Range    WBC 8.0 4.0 - 11.0 10e9/L    RBC Count 4.69 3.8 - 5.2 10e12/L    Hemoglobin 14.8 11.7 - 15.7 g/dL    Hematocrit 43.1 35.0 - 47.0 %    MCV 92 78 - 100 fl    MCH 31.6 26.5 - 33.0 pg    MCHC 34.3 31.5 - 36.5 g/dL    RDW 13.1 10.0 - 15.0 %    Platelet Count 132 (L) 150 - 450 10e9/L    Diff Method Automated Method     % Neutrophils 66.2 %    % Lymphocytes 25.0 %    % Monocytes 5.9 %    %  Eosinophils 2.1 %    % Basophils 0.5 %    % Immature Granulocytes 0.3 %    Nucleated RBCs 0 0 /100    Absolute Neutrophil 5.3 1.6 - 8.3 10e9/L    Absolute Lymphocytes 2.0 0.8 - 5.3 10e9/L    Absolute Monocytes 0.5 0.0 - 1.3 10e9/L    Absolute Eosinophils 0.2 0.0 - 0.7 10e9/L    Absolute Basophils 0.0 0.0 - 0.2 10e9/L    Abs Immature Granulocytes 0.0 0 - 0.4 10e9/L    Absolute Nucleated RBC 0.0    Basic metabolic panel   Result Value Ref Range    Sodium 138 133 - 144 mmol/L    Potassium 3.8 3.4 - 5.3 mmol/L    Chloride 101 94 - 109 mmol/L    Carbon Dioxide 30 20 - 32 mmol/L    Anion Gap 7 3 - 14 mmol/L    Glucose 123 (H) 70 - 99 mg/dL    Urea Nitrogen 11 7 - 30 mg/dL    Creatinine 0.67 0.52 - 1.04 mg/dL    GFR Estimate >90 >60 mL/min/[1.73_m2]    GFR Estimate If Black >90 >60 mL/min/[1.73_m2]    Calcium 9.1 8.5 - 10.1 mg/dL   CRP inflammation   Result Value Ref Range    CRP Inflammation 6.2 0.0 - 8.0 mg/L   CT Soft Tissue Neck w Contrast    Narrative    CT OF THE NECK WITH CONTRAST    6/20/2021 12:15 PM     COMPARISON: None    HISTORY: Mouth and neck swelling. Pain with swallowing.    TECHNIQUE:  Axial CT images of the neck were acquired after the  intravenous administration of 80mL Isovue-370 nonionic iodinated  contrast material. Coronal reconstructions were created.    FINDINGS:   Mucosal surfaces of the upper aerodigestive tract are symmetrical and  unremarkable, without discrete mass. The larynx is unremarkable. The  parapharyngeal and  spaces are unremarkable. 2 mm  calcification in the vicinity of the right submandibular duct visible  on axial image 18 of the angled views. Associated enlargement and  hyperenhancement of the right submandibular gland with subtle adjacent  fat stranding. The right sublingual gland is also mildly inflamed,  consistent with mild reactive change. Normal left submandibular and  sublingual glands. Normal parotid glands. Small bilateral cervical  lymph nodes are noted; none  are pathologic by size or morphology  criteria.     Vascular structures of the neck are widely patent. The thyroid gland  is grossly unremarkable.     The included orbital and intracranial compartments are unremarkable.  The visualized portions of the paranasal sinuses are clear. The  mastoid air cells and middle ear cavities are clear. Solid C4-C7  anterior cervical discectomy and fusion. The included lung apices are  clear.      Impression    IMPRESSION:   1.  2 mm calcification likely in the distal right submandibular duct  visible on axial image 18 of the re-angled series.  2.   Associated mild acute inflammation of the right submandibular  gland.  3.  Mild inflammation of the right sublingual gland is presumably  reactive.  4.  No lymphadenopathy.     Radiation dose for this scan was reduced using automated exposure  control, adjustment of the mA and/or kV according to patient size, or  iterative reconstruction technique    RAMON ODEN MD       Medications   0.9% sodium chloride BOLUS (1,000 mLs Intravenous New Bag 6/20/21 1122)     Followed by   sodium chloride 0.9% infusion (has no administration in time range)   sodium chloride (PF) 0.9% PF flush 3 mL (3 mLs Intracatheter Given 6/20/21 1202)   iopamidol (ISOVUE-370) solution 500 mL (80 mLs Intravenous Given 6/20/21 1202)   new 100 ml saline bag (68 mLs Intravenous Given 6/20/21 1202)     IV was established and blood work was ordered.  Rapid strep was ordered.  CT of the neck with contrast ordered to further assess for submandibular abscess.  Assessments & Plan (with Medical Decision Making)   Zina Yu is a 60 year old female who presents with concerns for oral swelling and throat pain.  Patient states symptoms began yesterday.  She states she thinks is swelling under her tongue and into her throat.  She denies difficulty with speech or swallowing secretions but hurts to swallow.  Denies fever or chills.  She was recently started on topical  diclofenac for Achilles tendon swelling.  No other new exposures, meds, foods, or similar previous reaction in the past.  A mild nonproductive cough.  Denies change in her taste or smell.  Denies abdominal pain, nausea or vomiting.  No diarrhea dysuria.  No leg pain or swelling.  No skin rashes.  Patient did have a negative Covid test on 6/1/2021.  Treatment for symptoms prior to arrival today.  On presentation patient was afebrile and vitally stable.  She had no obvious oral swelling.  Did have tenderness under her tongue.  Neck is obese making difficult to assess for localized swelling.  Adenopathy is tender anterior nodes.  Work-up included negative blood work did have a submandibular distal stone and I suspect that is causing her issues with pain and swelling.  She cannot take NSAIDs.  She has oral Percocet at home which she can take.  We will put on Augmentin in case there is start of infection.  Hot pack.  Gentle massage.  She can try sour candies to increase the salivary gland production.  If you have persistence of symptoms follow-up with  ENT in 1 week  I have reviewed the nursing notes.    I have reviewed the findings, diagnosis, plan and need for follow up with the patient.       New Prescriptions    AMOXICILLIN-CLAVULANATE (AUGMENTIN) 875-125 MG TABLET    Take 1 tablet by mouth 2 times daily for 7 days       Final diagnoses:   Submandibular duct obstruction       6/20/2021   [facility] EMERGENCY DEPT     Eliud Gardiner MD  06/20/21 4782